# Patient Record
Sex: FEMALE
[De-identification: names, ages, dates, MRNs, and addresses within clinical notes are randomized per-mention and may not be internally consistent; named-entity substitution may affect disease eponyms.]

---

## 2019-01-14 ENCOUNTER — APPOINTMENT (OUTPATIENT)
Dept: OTOLARYNGOLOGY | Facility: CLINIC | Age: 39
End: 2019-01-14
Payer: COMMERCIAL

## 2019-01-14 VITALS
DIASTOLIC BLOOD PRESSURE: 77 MMHG | OXYGEN SATURATION: 100 % | HEART RATE: 59 BPM | SYSTOLIC BLOOD PRESSURE: 119 MMHG | RESPIRATION RATE: 14 BRPM | TEMPERATURE: 98.2 F

## 2019-01-14 VITALS — BODY MASS INDEX: 23.32 KG/M2 | WEIGHT: 140 LBS | HEIGHT: 65 IN

## 2019-01-14 DIAGNOSIS — J32.9 CHRONIC SINUSITIS, UNSPECIFIED: ICD-10-CM

## 2019-01-14 DIAGNOSIS — Z78.9 OTHER SPECIFIED HEALTH STATUS: ICD-10-CM

## 2019-01-14 PROBLEM — Z00.00 ENCOUNTER FOR PREVENTIVE HEALTH EXAMINATION: Status: ACTIVE | Noted: 2019-01-14

## 2019-01-14 PROCEDURE — 31231 NASAL ENDOSCOPY DX: CPT

## 2019-01-14 PROCEDURE — 99203 OFFICE O/P NEW LOW 30 MIN: CPT | Mod: 25

## 2019-01-14 NOTE — HISTORY OF PRESENT ILLNESS
[SMR] : submucous resection (SMR) [Sinus] : sinus surgery [de-identified] : 38 years old female patient with history of sinus infection and sinus headaches for the past 2 weeks.   Patient is present today in the office with Purulent Post Nasal Drip. Tiny Nasal polyps.  History of Sinus Surgery 10 years ago  [de-identified] : 10 years ago.

## 2019-01-14 NOTE — REASON FOR VISIT
[Initial Consultation] : an initial consultation for [FreeTextEntry2] : sinus infection and sinus headaches for the past 2 weeks.  Patient states her level of severity is a level 6 out of 10 and it occurs constant.  Patient states nothing helps to improve or worsens her sinus infection and sinus headaches for the past 2 weeks.

## 2019-01-14 NOTE — PROCEDURE
[Congested] : congested [Deviated to the Lt] : deviated to the left [Image(s) Captured] : image(s) captured and filed [Topical Lidocaine] : topical lidocaine [Flexible Endoscope] : examined with the flexible endoscope [Serial Number: ___] : Serial Number: [unfilled] [FreeTextEntry6] : Purulent Post Nasal Drip. Tiny Nasal polyps.  History of Sinus Surgery 10 years ago \par DNS Lt [de-identified] : Purulent Post Nasal Drip. Tiny Nasal polyps.  History of Sinus Surgery 10 years ago

## 2019-01-14 NOTE — REVIEW OF SYSTEMS
[Patient Intake Form Reviewed] : Patient intake form was reviewed [Ear Pain] : ear pain [Dizziness] : dizziness [As Noted in HPI] : as noted in HPI [Nasal Congestion] : nasal congestion [Negative] : Heme/Lymph

## 2019-01-14 NOTE — PHYSICAL EXAM
[de-identified] : Purulent Post Nasal Drip. Tiny Nasal polyps.  History of Sinus Surgery 10 years ago  [Normal] : mucosa is normal [Midline] : trachea located in midline position

## 2019-01-23 DIAGNOSIS — R51 HEADACHE: ICD-10-CM

## 2019-01-23 DIAGNOSIS — J32.8 OTHER CHRONIC SINUSITIS: ICD-10-CM

## 2019-01-25 ENCOUNTER — APPOINTMENT (OUTPATIENT)
Dept: CT IMAGING | Facility: HOSPITAL | Age: 39
End: 2019-01-25

## 2019-02-01 ENCOUNTER — APPOINTMENT (OUTPATIENT)
Dept: OTOLARYNGOLOGY | Facility: CLINIC | Age: 39
End: 2019-02-01